# Patient Record
Sex: FEMALE | Race: WHITE | NOT HISPANIC OR LATINO | URBAN - METROPOLITAN AREA
[De-identification: names, ages, dates, MRNs, and addresses within clinical notes are randomized per-mention and may not be internally consistent; named-entity substitution may affect disease eponyms.]

---

## 2017-08-03 ENCOUNTER — ALLSCRIPTS OFFICE VISIT (OUTPATIENT)
Dept: OTHER | Facility: OTHER | Age: 52
End: 2017-08-03

## 2017-08-03 ENCOUNTER — LAB REQUISITION (OUTPATIENT)
Dept: LAB | Facility: HOSPITAL | Age: 52
End: 2017-08-03
Payer: COMMERCIAL

## 2017-08-03 DIAGNOSIS — Z12.31 ENCOUNTER FOR SCREENING MAMMOGRAM FOR MALIGNANT NEOPLASM OF BREAST: ICD-10-CM

## 2017-08-03 DIAGNOSIS — Z01.419 ENCOUNTER FOR GYNECOLOGICAL EXAMINATION WITHOUT ABNORMAL FINDING: ICD-10-CM

## 2017-08-03 PROCEDURE — G0145 SCR C/V CYTO,THINLAYER,RESCR: HCPCS | Performed by: OBSTETRICS & GYNECOLOGY

## 2017-08-03 PROCEDURE — 87624 HPV HI-RISK TYP POOLED RSLT: CPT | Performed by: OBSTETRICS & GYNECOLOGY

## 2017-08-08 LAB — HPV RRNA GENITAL QL NAA+PROBE: NORMAL

## 2017-08-09 LAB
LAB AP GYN PRIMARY INTERPRETATION: NORMAL
Lab: NORMAL

## 2017-08-10 ENCOUNTER — GENERIC CONVERSION - ENCOUNTER (OUTPATIENT)
Dept: OTHER | Facility: OTHER | Age: 52
End: 2017-08-10

## 2018-01-11 NOTE — RESULT NOTES
Verified Results  (1) THIN PREP PAP WITH IMAGING [id number] 12:23PM [facility] Order Number: [id number]     Test Name Result Flag Reference   LAB AP CASE REPORT (Report)     Gynecologic Cytology Report            Case: [id number]                  Authorizing Provider: Tucker Amin MD     Collected:      08/03/2017 1223        First Screen:     ZACH Hidalgo   Received:      08/07/2017 1318        Specimen:  LIQUID-BASED PAP, SCREENING, Cervix   HPV HIGH RISK RESULT (Report)     HPV, High Risk: HPV NEG, HPV16 NEG, HPV18 NEG      Other High Risk HPV Negative, HPV 16 Negative, HPV 18 Negative  HPV types: 16,18,31,33,35,39,45,51,52,56,58,59,66 and 68 DNA are undetectable or below the pre-set threshold  [employer]???s FDA approved Dmitriy 4800 is utilized with strict adherence to the ???s instruction  manual to test for the presence of High-Risk HPV DNA, as well as HPV 16 and HPV 18  This instrument  has been validated by our laboratory and/or by the   A negative result does not preclude the presence of HPV infection because results depend on adequate  specimen collection, absence of inhibitors and sufficient DNA to be detected  Additionally, HPV negative  results are not intended to prevent women from proceeding to colposcopy if clinically warranted  Positive HPV test results indicate the presence of any one or more of the high risk types, but since patients  are often co-infected with low-risk types it does not rule out the presence of low-risk types in patients  with mixed infections  LAB AP GYN PRIMARY INTERPRETATION      Negative for intraepithelial lesion or malignancy  Electronically signed by ZACH Hidalgo on 8/9/2017 at 4:39 PM   LAB AP GYN SPECIMEN ADEQUACY      Satisfactory for evaluation  Endocervical/transformation zone component present     LAB AP GYN ADDITIONAL INFORMATION (Report)     [employer]'s FDA approved ,  and ThinPrep Imaging System are   utilized with strict adherence to the 's instruction manual to   prepare gynecologic and non-gynecologic cytology specimens for the   production of ThinPrep slides as well as for gynecologic ThinPrep imaging  These processes have been validated by our laboratory and/or by the     The Pap test is not a diagnostic procedure and should not be used as the   sole means to detect cervical cancer  It is only a screening procedure to   aid in the detection of cervical cancer and its precursors  Both   false-negative and false-positive results have been experienced  Your   patient's test result should be interpreted in this context together with   the history and clinical findings     LAB AP LMP not given     not given

## 2018-01-13 VITALS
HEART RATE: 78 BPM | BODY MASS INDEX: 23.25 KG/M2 | WEIGHT: 127.13 LBS | OXYGEN SATURATION: 98 % | DIASTOLIC BLOOD PRESSURE: 66 MMHG | SYSTOLIC BLOOD PRESSURE: 110 MMHG

## 2018-04-23 ENCOUNTER — OFFICE VISIT (OUTPATIENT)
Dept: LAB | Facility: HOSPITAL | Age: 53
End: 2018-04-23
Payer: COMMERCIAL

## 2018-04-23 ENCOUNTER — TRANSCRIBE ORDERS (OUTPATIENT)
Dept: ADMINISTRATIVE | Facility: HOSPITAL | Age: 53
End: 2018-04-23

## 2018-04-23 DIAGNOSIS — Z01.818 PREOP EXAMINATION: ICD-10-CM

## 2018-04-23 DIAGNOSIS — Z01.818 PREOP EXAMINATION: Primary | ICD-10-CM

## 2018-04-23 PROCEDURE — 93005 ELECTROCARDIOGRAM TRACING: CPT

## 2018-04-24 LAB
ATRIAL RATE: 73 BPM
P AXIS: 14 DEGREES
PR INTERVAL: 128 MS
QRS AXIS: 64 DEGREES
QRSD INTERVAL: 78 MS
QT INTERVAL: 362 MS
QTC INTERVAL: 398 MS
T WAVE AXIS: 60 DEGREES
VENTRICULAR RATE: 73 BPM

## 2018-04-24 PROCEDURE — 93010 ELECTROCARDIOGRAM REPORT: CPT | Performed by: INTERNAL MEDICINE

## 2018-06-05 ENCOUNTER — OFFICE VISIT (OUTPATIENT)
Dept: OBGYN CLINIC | Facility: CLINIC | Age: 53
End: 2018-06-05
Payer: COMMERCIAL

## 2018-06-05 VITALS
SYSTOLIC BLOOD PRESSURE: 98 MMHG | BODY MASS INDEX: 21.68 KG/M2 | DIASTOLIC BLOOD PRESSURE: 62 MMHG | HEART RATE: 86 BPM | HEIGHT: 64 IN | WEIGHT: 127 LBS

## 2018-06-05 DIAGNOSIS — M65.312 TRIGGER THUMB OF LEFT HAND: Primary | ICD-10-CM

## 2018-06-05 PROCEDURE — 99203 OFFICE O/P NEW LOW 30 MIN: CPT | Performed by: ORTHOPAEDIC SURGERY

## 2018-06-05 PROCEDURE — 20550 NJX 1 TENDON SHEATH/LIGAMENT: CPT | Performed by: ORTHOPAEDIC SURGERY

## 2018-06-05 RX ORDER — LIDOCAINE HYDROCHLORIDE 10 MG/ML
0.5 INJECTION, SOLUTION INFILTRATION; PERINEURAL
Status: COMPLETED | OUTPATIENT
Start: 2018-06-05 | End: 2018-06-05

## 2018-06-05 RX ORDER — GABAPENTIN 100 MG/1
CAPSULE ORAL
COMMUNITY
Start: 2018-05-15

## 2018-06-05 RX ORDER — TRAZODONE HYDROCHLORIDE 50 MG/1
TABLET ORAL
COMMUNITY
Start: 2018-05-03

## 2018-06-05 RX ORDER — CITALOPRAM 20 MG/1
TABLET ORAL
COMMUNITY
Start: 2018-05-03

## 2018-06-05 RX ORDER — DIVALPROEX SODIUM 500 MG/1
TABLET, DELAYED RELEASE ORAL
COMMUNITY

## 2018-06-05 RX ORDER — ACETAMINOPHEN 500 MG
2 TABLET ORAL DAILY
COMMUNITY

## 2018-06-05 RX ORDER — DIVALPROEX SODIUM 250 MG/1
TABLET, DELAYED RELEASE ORAL
COMMUNITY
Start: 2018-05-04

## 2018-06-05 RX ORDER — TRIAMCINOLONE ACETONIDE 40 MG/ML
20 INJECTION, SUSPENSION INTRA-ARTICULAR; INTRAMUSCULAR
Status: COMPLETED | OUTPATIENT
Start: 2018-06-05 | End: 2018-06-05

## 2018-06-05 RX ORDER — VALACYCLOVIR HYDROCHLORIDE 500 MG/1
TABLET, FILM COATED ORAL
COMMUNITY
Start: 2017-08-03

## 2018-06-05 RX ORDER — LORAZEPAM 2 MG/1
TABLET ORAL
COMMUNITY
Start: 2018-05-12

## 2018-06-05 RX ORDER — ZOLPIDEM TARTRATE 12.5 MG/1
1 TABLET, FILM COATED, EXTENDED RELEASE ORAL
COMMUNITY

## 2018-06-05 RX ORDER — PHENAZOPYRIDINE HYDROCHLORIDE 200 MG/1
TABLET, FILM COATED ORAL
COMMUNITY
Start: 2018-05-03

## 2018-06-05 RX ADMIN — TRIAMCINOLONE ACETONIDE 20 MG: 40 INJECTION, SUSPENSION INTRA-ARTICULAR; INTRAMUSCULAR at 12:28

## 2018-06-05 RX ADMIN — LIDOCAINE HYDROCHLORIDE 0.5 ML: 10 INJECTION, SOLUTION INFILTRATION; PERINEURAL at 12:28

## 2018-06-05 NOTE — PROGRESS NOTES
Assessment/Plan:  1  Trigger thumb of left hand       Tania Alonso has left-sided thumb pain consistent with trigger thumb  We did elect to provide a cortisone injection to her tendon sheath today  She tolerated the injection well and did receive some relief immediately after the injection  I informed her that this typically improves her discomfort in the next 3-4 weeks if this does not improve the pain and discomfort would like to see her back or discuss referral to Dr Emily Monroy if triggering worsens  Subjective:   Quynh Johnson is a 47 y o  female who presents for evaluation for left-sided thumb pain  She has noticed over the past 1 month that she has had increased discomfort over the volar aspect of her left thumb at the base around the MCP joint  She feels a lot of discomfort with attempted flexion of the thumb  She feels like increased activity at work using her hands as cause more discomfort  Her mother has a history of trigger thumb injected by Dr Emily Monroy in the past and she states that it feels similar to what her mother describes  She denies any trauma other than overuse in this area  She does feel occasional triggering sensation but is more pronounced with pain  She denies any numbness into her hand or thumb  She denies any pain in the wrist     Review of Systems   Constitutional: Negative for chills, fever and unexpected weight change  HENT: Negative for hearing loss, nosebleeds and sore throat  Eyes: Negative for pain, redness and visual disturbance  Respiratory: Negative for cough, shortness of breath and wheezing  Cardiovascular: Negative for chest pain, palpitations and leg swelling  Gastrointestinal: Negative for abdominal pain, nausea and vomiting  Endocrine: Negative for polydipsia and polyuria  Genitourinary: Negative for dysuria and hematuria  Musculoskeletal:        See HPI   Skin: Negative for rash and wound  Neurological: Negative for dizziness, numbness and headaches  Psychiatric/Behavioral: Negative for decreased concentration and suicidal ideas  The patient is not nervous/anxious  History reviewed  No pertinent past medical history  Past Surgical History:   Procedure Laterality Date     SECTION      LAPAROSCOPIC ENDOMETRIOSIS FULGURATION         History reviewed  No pertinent family history  Social History     Occupational History    Not on file  Social History Main Topics    Smoking status: Never Smoker    Smokeless tobacco: Never Used    Alcohol use No    Drug use: No    Sexual activity: Not on file         Current Outpatient Prescriptions:     acetaminophen (TYLENOL) 500 mg tablet, Take 2 tablets by mouth daily, Disp: , Rfl:     citalopram (CeleXA) 20 mg tablet, , Disp: , Rfl:     divalproex sodium (DEPAKOTE) 250 mg EC tablet, , Disp: , Rfl:     divalproex sodium (DEPAKOTE) 500 mg EC tablet, Take by mouth, Disp: , Rfl:     gabapentin (NEURONTIN) 100 mg capsule, , Disp: , Rfl:     LORazepam (ATIVAN) 2 mg tablet, , Disp: , Rfl:     phenazopyridine (PYRIDIUM) 200 mg tablet, , Disp: , Rfl:     traZODone (DESYREL) 50 mg tablet, , Disp: , Rfl:     valACYclovir (VALTREX) 500 mg tablet, Take by mouth, Disp: , Rfl:     zolpidem (AMBIEN CR) 12 5 MG CR tablet, Take 1 tablet by mouth, Disp: , Rfl:     Allergies   Allergen Reactions    Ciprofloxacin GI Intolerance       Objective:  Vitals:    18 1145   BP: 98/62   Pulse: 86       Left Hand Exam     Tenderness   Left hand tenderness location: Tenderness to palpation over the volar aspect of the MCP joint at base of the left thumb  Range of Motion     Hand   MP Thumb: abnormal   DIP Thumb: normal     Other   Erythema: absent  Sensation: normal  Pulse: present    Comments:  Swelling over the tendon sheath at the A1 pulley consistent with trigger finger            Physical Exam   Constitutional: She is oriented to person, place, and time  She appears well-developed and well-nourished  HENT:   Head: Normocephalic and atraumatic  Eyes: Conjunctivae are normal    Neck: Neck supple  Cardiovascular: Intact distal pulses  Pulmonary/Chest: Effort normal    Neurological: She is alert and oriented to person, place, and time  Skin: Skin is warm and dry  Psychiatric: She has a normal mood and affect  Her behavior is normal    Vitals reviewed          Hand/upper extremity injection  Date/Time: 6/5/2018 12:28 PM  Consent given by: patient  Site marked: site marked  Timeout: Immediately prior to procedure a time out was called to verify the correct patient, procedure, equipment, support staff and site/side marked as required   Supporting Documentation  Indications: pain and therapeutic   Procedure Details  Condition:trigger finger Location: thumb - L thumb A1   Needle size: 25 G  Ultrasound guidance: no  Approach: volar  Medications administered: 0 5 mL lidocaine 1 %; 20 mg triamcinolone acetonide 40 mg/mL  Patient tolerance: patient tolerated the procedure well with no immediate complications  Dressing:  Sterile dressing applied

## 2024-03-26 ENCOUNTER — TELEPHONE (OUTPATIENT)
Age: 59
End: 2024-03-26

## 2024-03-26 ENCOUNTER — OFFICE VISIT (OUTPATIENT)
Dept: GASTROENTEROLOGY | Facility: CLINIC | Age: 59
End: 2024-03-26
Payer: COMMERCIAL

## 2024-03-26 VITALS
BODY MASS INDEX: 23.74 KG/M2 | WEIGHT: 134 LBS | HEIGHT: 63 IN | HEART RATE: 64 BPM | SYSTOLIC BLOOD PRESSURE: 99 MMHG | OXYGEN SATURATION: 96 % | DIASTOLIC BLOOD PRESSURE: 66 MMHG

## 2024-03-26 DIAGNOSIS — K57.90 DIVERTICULOSIS: ICD-10-CM

## 2024-03-26 DIAGNOSIS — A07.1 GIARDIA: ICD-10-CM

## 2024-03-26 DIAGNOSIS — R19.7 DIARRHEA, UNSPECIFIED TYPE: ICD-10-CM

## 2024-03-26 DIAGNOSIS — R19.4 CHANGE IN BOWEL HABIT: Primary | ICD-10-CM

## 2024-03-26 DIAGNOSIS — R10.30 LOWER ABDOMINAL PAIN: ICD-10-CM

## 2024-03-26 PROCEDURE — 99203 OFFICE O/P NEW LOW 30 MIN: CPT | Performed by: NURSE PRACTITIONER

## 2024-03-26 RX ORDER — CLONAZEPAM 1 MG/1
TABLET, ORALLY DISINTEGRATING ORAL
COMMUNITY
Start: 2024-02-13

## 2024-03-26 RX ORDER — FLUVOXAMINE MALEATE 100 MG
150 TABLET ORAL DAILY
COMMUNITY
Start: 2024-03-17

## 2024-03-26 RX ORDER — DIVALPROEX SODIUM 500 MG/1
1000 TABLET, EXTENDED RELEASE ORAL
COMMUNITY
Start: 2024-03-16

## 2024-03-26 RX ORDER — POLYETHYLENE GLYCOL 3350, SODIUM CHLORIDE, SODIUM BICARBONATE, POTASSIUM CHLORIDE 420; 11.2; 5.72; 1.48 G/4L; G/4L; G/4L; G/4L
4000 POWDER, FOR SOLUTION ORAL ONCE
Qty: 4000 ML | Refills: 0 | Status: SHIPPED | OUTPATIENT
Start: 2024-03-26 | End: 2024-03-26

## 2024-03-26 RX ORDER — PROPRANOLOL HCL 60 MG
60 CAPSULE, EXTENDED RELEASE 24HR ORAL
COMMUNITY
Start: 2024-03-04

## 2024-03-26 RX ORDER — TRAZODONE HYDROCHLORIDE 100 MG/1
100 TABLET ORAL
COMMUNITY
Start: 2024-03-17

## 2024-03-26 NOTE — PROGRESS NOTES
[facility] [place] - Outpatient Consultation  Rosina Tracey 59 y.o. female MRN: [id number]  Encounter: [id number]          ASSESSMENT AND PLAN:      1. Change in bowel habit  2. Lower abdominal pain  3. Diverticulosis  Pt with recent change in bowel habit with alternating constipation/diarrhea with associated lower abdominal cramping for the last 3 months, worse in the last month. Used to have chronic diarrhea and last colonoscopy in 2016 showed internal hemorrhoids and mild diverticulosis with random colon bx negative for microscopic colitis, however now symptoms seem related to constipation with no bowel movement for 3-4 days followed by abdominal cramping and diarrhea.  Denies any medication changes, unintended weight loss, rectal bleeding.  Reports dietary fiber tends to decrease in the winter months because she prefers fresh produce.    -Obtain baseline labs including TSH   -Colonoscopy ordered due to change in bowel habit  -Schedule CTAP to assess for any active inflammation/diverticulitis prior to colonoscopy  -Start Miralax 17g daily in the interim for constipation    -     CBC (Includes Diff/Plt) (Refl); Future  -     Comprehensive metabolic panel; Future  -     TSH, 3rd generation with Free T4 reflex; Future  -     CT abdomen pelvis w contrast; Future; Expected date: 03/26/2024  -     Colonoscopy; Future; Expected date: 03/26/2024  -     polyethylene glycol-electrolytes (TriLyte) 4000 mL solution; Take 4,000 mL by mouth once for 1 dose Take 4000 mL by mouth once for 1 dose. Use as directed          ______________________________________________________________________    HPI:  Rosina Tracey is a 59 y.o. female presenting for evaluation of abdominal cramping and irregular bowel habits. Symptoms have been ongoing the last 3 months, but particularly worse over the last month. She reports she goes 3-4 days without a bowel movement and then has abdominal cramping and diarrhea. Denies any  rectal bleeding, unintended weight loss, med changes. She did start a new diet with weight watchers but this was a year ago. She has been taking Miralax once every 2-3 weeks. Has associated nausea/bloating. Denies any family history of colon cancer. Last colonoscopy was in 2016 with random colon biopsies negative for microscopic colitis. She last saw Dr. Hale for constipation which responded well to Miralax.          REVIEW OF SYSTEMS:    CONSTITUTIONAL: Denies any fever, chills, rigors, and weight loss.  HEENT: No earache or tinnitus.  CARDIOVASCULAR: No chest pain or palpitations.   RESPIRATORY: Denies any cough, hemoptysis, shortness of breath or dyspnea on exertion.  GASTROINTESTINAL: As noted in the History of Present Illness.   GENITOURINARY: Denies any hematuria or dysuria.  NEUROLOGIC: No dizziness or vertigo.   MUSCULOSKELETAL: Denies any joint swellings.  SKIN: Denies skin rashes or itching.   ENDOCRINE: Denies excessive thirst. Denies intolerance to heat or cold.  PSYCHOSOCIAL: Denies depression or anxiety. Denies any recent memory loss.       Historical Information   History reviewed. No pertinent past medical history.  Past Surgical History:   Procedure Laterality Date     SECTION      LAPAROSCOPIC ENDOMETRIOSIS FULGURATION       Social History   Social History     Substance and Sexual Activity   Alcohol Use No     Social History     Substance and Sexual Activity   Drug Use No     Social History     Tobacco Use   Smoking Status Never   Smokeless Tobacco Never     History reviewed. No pertinent family history.    Meds/Allergies       Current Outpatient Medications:     acetaminophen (TYLENOL) 500 mg tablet    clonazePAM (KlonoPIN) 1 MG disintegrating tablet    divalproex sodium (DEPAKOTE ER) 500 mg 24 hr tablet    fluvoxaMINE (LUVOX) 100 mg tablet    polyethylene glycol-electrolytes (TriLyte) 4000 mL solution    propranolol (INDERAL LA) 60 mg 24 hr capsule    traZODone (DESYREL) 100 mg tablet    " valACYclovir (VALTREX) 500 mg tablet    citalopram (CeleXA) 20 mg tablet    gabapentin (NEURONTIN) 100 mg capsule    LORazepam (ATIVAN) 2 mg tablet    phenazopyridine (PYRIDIUM) 200 mg tablet    zolpidem (AMBIEN CR) 12.5 MG CR tablet    Allergies   Allergen Reactions    Azithromycin Other (See Comments)    Ciprofloxacin GI Intolerance and Other (See Comments)           Objective     Blood pressure 99/66, pulse 64, height 5' 3\" (1.6 m), weight 60.8 kg (134 lb), SpO2 96%. Body mass index is 23.74 kg/m².        PHYSICAL EXAM:      General Appearance:   Alert, cooperative, no distress   HEENT:   Normocephalic, atraumatic, anicteric.     Neck:  Supple, symmetrical, trachea midline   Lungs:   Clear to auscultation bilaterally; no rales, rhonchi or wheezing; respirations unlabored    Heart::   Regular rate and rhythm; no murmur.   Abdomen:   Soft, non-tender, non-distended; normal bowel sounds; no masses, no organomegaly    Genitalia:   Deferred    Rectal:   Deferred    Extremities:  No cyanosis, clubbing or edema    Skin:  No jaundice, rashes, or lesions    Lymph nodes:  No palpable cervical lymphadenopathy        Lab Results:   No visits with results within 1 Day(s) from this visit.   Latest known visit with results is:   Office Visit on 04/23/2018   Component Date Value    Ventricular Rate 04/23/2018 73     Atrial Rate 04/23/2018 73     MO Interval 04/23/2018 128     QRSD Interval 04/23/2018 78     QT Interval 04/23/2018 362     QTC Interval 04/23/2018 398     P Axis 04/23/2018 14     QRS Axis 04/23/2018 64     T Wave Dubois 04/23/2018 60          Radiology Results:   No results found.  "

## 2024-03-26 NOTE — PATIENT INSTRUCTIONS
Scheduled date of colonoscopy (as of today):24  Physician performing colonoscopy:Dr. Hale  Location of colonoscopy:[facility]  Bowel prep reviewed with patient:Enrique  Instructions reviewed with patient by:lauren  Clearances:n/a    Start Miralax 1 capful daily    High Fiber Diet   AMBULATORY CARE:   A high-fiber diet  includes foods that have a high amount of fiber. Fiber is the part of fruits, vegetables, and grains that is not broken down by your body. Fiber keeps your bowel movements regular. Fiber can also help lower your cholesterol level, control blood sugar in people with diabetes, and relieve constipation. Fiber can also help you control your weight because it helps you feel full faster. Most adults should eat 25 to 35 grams of fiber each day. Talk to your dietitian or healthcare provider about the amount of fiber you need.  Good sources of fiber:       Foods with at least 4 grams of fiber per serving:      ? to ½ cup of high-fiber cereal (check the nutrition label on the box)    ½ cup of blackberries or raspberries    4 dried prunes    1 cooked artichoke    ½ cup of cooked legumes, such as lentils, or red, kidney, and lubin beans    Foods with 1 to 3 grams of fiber per servin slice of whole-wheat, pumpernickel, or rye bread    ½ cup of cooked brown rice    4 whole-wheat crackers    1 cup of oatmeal    ½ cup of cereal with 1 to 3 grams of fiber per serving (check the nutrition label on the box)    1 small piece of fruit, such as an apple, banana, pear, kiwi, or orange    3 dates    ½ cup of canned apricots, fruit cocktail, peaches, or pears    ½ cup of raw or cooked vegetables, such as carrots, cauliflower, cabbage, spinach, squash, or corn  Ways that you can increase fiber in your diet:   Choose brown or wild rice instead of white rice.     Use whole wheat flour in recipes instead of white or all-purpose flour.     Add beans and peas to casseroles or soups.     Choose fresh fruit and vegetables with  peels or skins on instead of juices.    Other diet guidelines to follow:   Add fiber to your diet slowly.  You may have abdominal discomfort, bloating, and gas if you add fiber to your diet too quickly.     Drink plenty of liquids as you add fiber to your diet.  You may have nausea or develop constipation if you do not drink enough water. Ask how much liquid to drink each day and which liquids are best for you.    © Copyright [employer] 2023 Information is for End User's use only and may not be sold, redistributed or otherwise used for commercial purposes.  The above information is an  only. It is not intended as medical advice for individual conditions or treatments. Talk to your doctor, nurse or pharmacist before following any medical regimen to see if it is safe and effective for you.

## 2024-03-26 NOTE — LETTER
[facility]  [address] [address]  [phone]  Dept: [phone]    March 27, 2024     Patient: Rosina Tracey   YOB: 1965   Date of Visit: 3/26/24       To Whom it May Concern:    Rosina Tracey is under my professional care. She was seen in the office 03/26/24. She may return to work on 4/2/24 without limitations.    If you have any questions or concerns, please don't hesitate to call.         Sincerely,          NATALIA Holguin

## 2024-03-27 ENCOUNTER — TELEPHONE (OUTPATIENT)
Age: 59
End: 2024-03-27

## 2024-03-27 NOTE — TELEPHONE ENCOUNTER
Pt is calling to see if she can have a work note until after her ct scan on 4/1/24. She is requesting a work note until 4/2/24. Please reach back out to the pt

## 2024-03-27 NOTE — TELEPHONE ENCOUNTER
Spoke to patient she doesn't feel any worse than when she was in the office just feels fatigued/wiped out. Works at a busy [employer] and would like off until she regains her strength. Reports 1 loose BM daily.

## 2024-03-28 ENCOUNTER — APPOINTMENT (OUTPATIENT)
Dept: LAB | Facility: HOSPITAL | Age: 59
End: 2024-03-28
Payer: COMMERCIAL

## 2024-03-28 DIAGNOSIS — R19.7 DIARRHEA, UNSPECIFIED TYPE: ICD-10-CM

## 2024-03-28 DIAGNOSIS — R10.30 LOWER ABDOMINAL PAIN: ICD-10-CM

## 2024-03-28 DIAGNOSIS — R19.4 CHANGE IN BOWEL HABIT: ICD-10-CM

## 2024-03-28 LAB
ALBUMIN SERPL BCP-MCNC: 4.1 G/DL (ref 3.5–5)
ALP SERPL-CCNC: 29 U/L (ref 34–104)
ALT SERPL W P-5'-P-CCNC: 13 U/L (ref 7–52)
ANION GAP SERPL CALCULATED.3IONS-SCNC: 5 MMOL/L (ref 4–13)
AST SERPL W P-5'-P-CCNC: 18 U/L (ref 13–39)
BASOPHILS # BLD AUTO: 0.01 THOUSANDS/ÂΜL (ref 0–0.1)
BASOPHILS NFR BLD AUTO: 0 % (ref 0–1)
BILIRUB SERPL-MCNC: 0.65 MG/DL (ref 0.2–1)
BUN SERPL-MCNC: 14 MG/DL (ref 5–25)
CALCIUM SERPL-MCNC: 9.5 MG/DL (ref 8.4–10.2)
CHLORIDE SERPL-SCNC: 108 MMOL/L (ref 96–108)
CO2 SERPL-SCNC: 30 MMOL/L (ref 21–32)
CREAT SERPL-MCNC: 0.76 MG/DL (ref 0.6–1.3)
EOSINOPHIL # BLD AUTO: 0.4 THOUSAND/ÂΜL (ref 0–0.61)
EOSINOPHIL NFR BLD AUTO: 13 % (ref 0–6)
ERYTHROCYTE [DISTWIDTH] IN BLOOD BY AUTOMATED COUNT: 12.1 % (ref 11.6–15.1)
GFR SERPL CREATININE-BSD FRML MDRD: 86 ML/MIN/1.73SQ M
GLUCOSE P FAST SERPL-MCNC: 87 MG/DL (ref 65–99)
HCT VFR BLD AUTO: 38.2 % (ref 34.8–46.1)
HGB BLD-MCNC: 12.8 G/DL (ref 11.5–15.4)
IMM GRANULOCYTES # BLD AUTO: 0.01 THOUSAND/UL (ref 0–0.2)
IMM GRANULOCYTES NFR BLD AUTO: 0 % (ref 0–2)
LYMPHOCYTES # BLD AUTO: 1.3 THOUSANDS/ÂΜL (ref 0.6–4.47)
LYMPHOCYTES NFR BLD AUTO: 41 % (ref 14–44)
MCH RBC QN AUTO: 31 PG (ref 26.8–34.3)
MCHC RBC AUTO-ENTMCNC: 33.5 G/DL (ref 31.4–37.4)
MCV RBC AUTO: 93 FL (ref 82–98)
MONOCYTES # BLD AUTO: 0.36 THOUSAND/ÂΜL (ref 0.17–1.22)
MONOCYTES NFR BLD AUTO: 11 % (ref 4–12)
NEUTROPHILS # BLD AUTO: 1.12 THOUSANDS/ÂΜL (ref 1.85–7.62)
NEUTS SEG NFR BLD AUTO: 35 % (ref 43–75)
NRBC BLD AUTO-RTO: 0 /100 WBCS
PLATELET # BLD AUTO: 168 THOUSANDS/UL (ref 149–390)
PMV BLD AUTO: 10.7 FL (ref 8.9–12.7)
POTASSIUM SERPL-SCNC: 4.2 MMOL/L (ref 3.5–5.3)
PROT SERPL-MCNC: 6.7 G/DL (ref 6.4–8.4)
RBC # BLD AUTO: 4.13 MILLION/UL (ref 3.81–5.12)
SODIUM SERPL-SCNC: 143 MMOL/L (ref 135–147)
TSH SERPL DL<=0.05 MIU/L-ACNC: 1.5 UIU/ML (ref 0.45–4.5)
WBC # BLD AUTO: 3.2 THOUSAND/UL (ref 4.31–10.16)

## 2024-03-28 PROCEDURE — 36415 COLL VENOUS BLD VENIPUNCTURE: CPT

## 2024-03-28 PROCEDURE — 84443 ASSAY THYROID STIM HORMONE: CPT

## 2024-03-28 PROCEDURE — 85025 COMPLETE CBC W/AUTO DIFF WBC: CPT

## 2024-03-28 PROCEDURE — 80053 COMPREHEN METABOLIC PANEL: CPT

## 2024-04-01 ENCOUNTER — HOSPITAL ENCOUNTER (OUTPATIENT)
Dept: RADIOLOGY | Facility: HOSPITAL | Age: 59
Discharge: HOME | End: 2024-04-01
Payer: COMMERCIAL

## 2024-04-01 ENCOUNTER — APPOINTMENT (OUTPATIENT)
Dept: LAB | Facility: HOSPITAL | Age: 59
End: 2024-04-01
Payer: COMMERCIAL

## 2024-04-01 DIAGNOSIS — K57.90 DIVERTICULOSIS: ICD-10-CM

## 2024-04-01 DIAGNOSIS — R19.4 CHANGE IN BOWEL HABIT: ICD-10-CM

## 2024-04-01 DIAGNOSIS — R10.30 LOWER ABDOMINAL PAIN: ICD-10-CM

## 2024-04-01 DIAGNOSIS — R19.7 DIARRHEA OF PRESUMED INFECTIOUS ORIGIN: ICD-10-CM

## 2024-04-01 PROCEDURE — 87209 SMEAR COMPLEX STAIN: CPT

## 2024-04-01 PROCEDURE — 87505 NFCT AGENT DETECTION GI: CPT

## 2024-04-01 PROCEDURE — 87329 GIARDIA AG IA: CPT

## 2024-04-01 PROCEDURE — 87177 OVA AND PARASITES SMEARS: CPT

## 2024-04-01 PROCEDURE — 74177 CT ABD & PELVIS W/CONTRAST: CPT

## 2024-04-01 PROCEDURE — 87493 C DIFF AMPLIFIED PROBE: CPT

## 2024-04-01 RX ADMIN — IOHEXOL 100 ML: 350 INJECTION, SOLUTION INTRAVENOUS at 09:12

## 2024-04-02 LAB
C COLI+JEJUNI TUF STL QL NAA+PROBE: NEGATIVE
C DIFF TOX GENS STL QL NAA+PROBE: NEGATIVE
EC STX1+STX2 GENES STL QL NAA+PROBE: NEGATIVE
G LAMBLIA AG STL QL IA: NEGATIVE
SALMONELLA SP SPAO STL QL NAA+PROBE: NEGATIVE
SHIGELLA SP+EIEC IPAH STL QL NAA+PROBE: NEGATIVE

## 2024-04-22 ENCOUNTER — ANESTHESIA (OUTPATIENT)
Dept: ANESTHESIOLOGY | Facility: HOSPITAL | Age: 59
End: 2024-04-22

## 2024-04-22 ENCOUNTER — NURSE TRIAGE (OUTPATIENT)
Age: 59
End: 2024-04-22

## 2024-04-22 ENCOUNTER — TELEPHONE (OUTPATIENT)
Age: 59
End: 2024-04-22

## 2024-04-22 ENCOUNTER — ANESTHESIA EVENT (OUTPATIENT)
Dept: ANESTHESIOLOGY | Facility: HOSPITAL | Age: 59
End: 2024-04-22

## 2024-04-22 DIAGNOSIS — R19.7 DIARRHEA, UNSPECIFIED TYPE: Primary | ICD-10-CM

## 2024-04-22 RX ORDER — DICYCLOMINE HYDROCHLORIDE 10 MG/1
10 CAPSULE ORAL 3 TIMES DAILY PRN
Qty: 90 CAPSULE | Refills: 1 | Status: SHIPPED | OUTPATIENT
Start: 2024-04-22

## 2024-04-22 NOTE — TELEPHONE ENCOUNTER
"Last OV 3/26/24  CT Abdomen/Pelvis 4/1/24 Normal  Labs/Stool studies 4/1/24 Negative  Colonoscopy 7/2/24    Pt transferred in with reports of loose to watery stools and bloating/cramping since Thursday. Having multiple BM's daily. Has almost had episodes of incontinence. \"Almost feels like it runs out when she stands up. Cramps in abdomen and back; pain 5-6/10    Eating crackers. Denies n/v, fever, sick contacts,new food, blood in stools, Did take Augmentin for sinus infection in February but only completed 4 days as it was difficult to tolerate.    Recommended OTC Pepto or Imodium, probiotics, increased hydration with electrolytes, clear diet and advance to BRAT/[place] diet as tolerated. Pt verbalized understanding.    Pt transferred to scheduling at end of call to move up Colonoscopy.    Please advise if you have further recommendations.     Pt uses [facility]    Pt [phone]      Reason for Disposition   SEVERE diarrhea (e.g., 7 or more times / day more than normal)    Answer Assessment - Initial Assessment Questions  1. DIARRHEA SEVERITY: \"How bad is the diarrhea?\" \"How many extra stools have you had in the past 24 hours than normal?\"     - NO DIARRHEA (SCALE 0)    - MILD (SCALE 1-3): Few loose or mushy BMs; increase of 1-3 stools over normal daily number of stools; mild increase in ostomy output.    -  MODERATE (SCALE 4-7): Increase of 4-6 stools daily over normal; moderate increase in ostomy output.  * SEVERE (SCALE 8-10; OR 'WORST POSSIBLE'): Increase of 7 or more stools daily over normal; moderate increase in ostomy output; incontinence.      Moderate to severe  2. ONSET: \"When did the diarrhea begin?\"       Thursday  3. BM CONSISTENCY: \"How loose or watery is the diarrhea?\"       Watery   4. VOMITING: \"Are you also vomiting?\" If Yes, ask: \"How many times in the past 24 hours?\"       Denies  5. ABDOMINAL PAIN: \"Are you having any abdominal pain?\" If Yes, ask: \"What does it feel like?\" (e.g., crampy, dull, " "intermittent, constant)       Cramping   6. ABDOMINAL PAIN SEVERITY: If present, ask: \"How bad is the pain?\"  (e.g., Scale 1-10; mild, moderate, or severe)    - MODERATE (4-7): interferes with normal activities or awakens from sleep, tender to touch       5-6/10  7. ORAL INTAKE: If vomiting, \"Have you been able to drink liquids?\" \"How much fluids have you had in the past 24 hours?\"      Yes, drinking  8. HYDRATION: \"Any signs of dehydration?\" (e.g., dry mouth [not just dry lips], too weak to stand, dizziness, new weight loss) \"When did you last urinate?\"      Denies  9. EXPOSURE: \"Have you traveled to a foreign country recently?\" \"Have you been exposed to anyone with diarrhea?\" \"Could you have eaten any food that was spoiled?\"      Denies  10. ANTIBIOTIC USE: \"Are you taking antibiotics now or have you taken antibiotics in the past 2 months?\"        Yes incomplete course  11. OTHER SYMPTOMS: \"Do you have any other symptoms?\" (e.g., fever, blood in stool)        Denies    Protocols used: Diarrhea-ADULT-[place]    "

## 2024-04-22 NOTE — TELEPHONE ENCOUNTER
Pt calling to advise she is having chronic diarrhea and wanted to speak to someone. Transferred to the nurse

## 2024-04-24 NOTE — TELEPHONE ENCOUNTER
Pt returning call, I did review recommendations. Pt reports her c diff stool study she had some at [facility] and it was negative. She is taking imodium occasionally she does not want it to cause more constipation.     Pt also wanted to know if she can switch bowel preps to miralax/ dulcolax instead of Golytely since it makes her sick. Please advise

## 2024-05-06 ENCOUNTER — HOSPITAL ENCOUNTER (OUTPATIENT)
Dept: GASTROENTEROLOGY | Facility: AMBULARY SURGERY CENTER | Age: 59
Setting detail: OUTPATIENT SURGERY
Discharge: HOME | End: 2024-05-06
Attending: INTERNAL MEDICINE | Admitting: INTERNAL MEDICINE
Payer: COMMERCIAL

## 2024-05-06 ENCOUNTER — ANESTHESIA EVENT (OUTPATIENT)
Dept: GASTROENTEROLOGY | Facility: AMBULARY SURGERY CENTER | Age: 59
End: 2024-05-06

## 2024-05-06 ENCOUNTER — ANESTHESIA (OUTPATIENT)
Dept: GASTROENTEROLOGY | Facility: AMBULARY SURGERY CENTER | Age: 59
End: 2024-05-06

## 2024-05-06 VITALS
HEART RATE: 62 BPM | SYSTOLIC BLOOD PRESSURE: 90 MMHG | BODY MASS INDEX: 21.85 KG/M2 | HEIGHT: 64 IN | RESPIRATION RATE: 16 BRPM | DIASTOLIC BLOOD PRESSURE: 63 MMHG | OXYGEN SATURATION: 96 % | WEIGHT: 128 LBS | TEMPERATURE: 97 F

## 2024-05-06 DIAGNOSIS — R19.4 CHANGE IN BOWEL HABIT: ICD-10-CM

## 2024-05-06 DIAGNOSIS — R10.30 LOWER ABDOMINAL PAIN: ICD-10-CM

## 2024-05-06 PROCEDURE — 45378 DIAGNOSTIC COLONOSCOPY: CPT | Performed by: INTERNAL MEDICINE

## 2024-05-06 RX ORDER — PROPOFOL 10 MG/ML
INJECTION, EMULSION INTRAVENOUS AS NEEDED
Status: DISCONTINUED | OUTPATIENT
Start: 2024-05-06 | End: 2024-05-06

## 2024-05-06 RX ORDER — SODIUM CHLORIDE, SODIUM LACTATE, POTASSIUM CHLORIDE, CALCIUM CHLORIDE 600; 310; 30; 20 MG/100ML; MG/100ML; MG/100ML; MG/100ML
100 INJECTION, SOLUTION INTRAVENOUS CONTINUOUS
Status: DISCONTINUED | OUTPATIENT
Start: 2024-05-06 | End: 2024-05-10 | Stop reason: HOSPADM

## 2024-05-06 RX ORDER — SODIUM CHLORIDE, SODIUM LACTATE, POTASSIUM CHLORIDE, CALCIUM CHLORIDE 600; 310; 30; 20 MG/100ML; MG/100ML; MG/100ML; MG/100ML
100 INJECTION, SOLUTION INTRAVENOUS CONTINUOUS
Status: CANCELLED | OUTPATIENT
Start: 2024-05-06

## 2024-05-06 RX ADMIN — SODIUM CHLORIDE, SODIUM LACTATE, POTASSIUM CHLORIDE, AND CALCIUM CHLORIDE: .6; .31; .03; .02 INJECTION, SOLUTION INTRAVENOUS at 11:18

## 2024-05-06 RX ADMIN — PROPOFOL 120 MG: 10 INJECTION, EMULSION INTRAVENOUS at 11:22

## 2024-05-06 RX ADMIN — PROPOFOL 20 MG: 10 INJECTION, EMULSION INTRAVENOUS at 11:29

## 2024-05-06 RX ADMIN — PROPOFOL 30 MG: 10 INJECTION, EMULSION INTRAVENOUS at 11:25

## 2024-05-06 RX ADMIN — PROPOFOL 20 MG: 10 INJECTION, EMULSION INTRAVENOUS at 11:27

## 2024-05-06 NOTE — H&P
"History and Physical -  [facility]  Rosina KELIN Tracey 59 y.o. female MRN: [id number]        HPI: 59-year-old female with history of anxiety, depression was referred because of change in bowel habits    Historical Information   Past Medical History:   Diagnosis Date    Anxiety     Depression      Past Surgical History:   Procedure Laterality Date     SECTION      LAPAROSCOPIC ENDOMETRIOSIS FULGURATION       Social History   Social History     Substance and Sexual Activity   Alcohol Use No     Social History     Substance and Sexual Activity   Drug Use No     Social History     Tobacco Use   Smoking Status Never   Smokeless Tobacco Never     History reviewed. No pertinent family history.    Meds/Allergies     (Not in a hospital admission)      Allergies   Allergen Reactions    Azithromycin Other (See Comments)    Ciprofloxacin GI Intolerance and Other (See Comments)       Objective     Blood pressure 100/52, pulse 62, temperature (!) 97.1 °F (36.2 °C), temperature source Temporal, resp. rate 16, height 5' 3.5\" (1.613 m), weight 58.1 kg (128 lb), SpO2 97%.    Physical Exam:    Chest- CTA  Heart- RRR  Abdomen- NT/ND  Extremities- No edema    ASSESSMENT:     Change in bowel habits    PLAN:    Colonoscopy              "

## 2024-05-06 NOTE — ANESTHESIA PREPROCEDURE EVALUATION
clonazePAM (KlonoPIN) 1 MG disintegrating tablet   --  24  --  Historical Provider, MD    divalproex sodium (DEPAKOTE ER) 500 mg 24 hr tablet   --  24  --  Historical Provider, MD    fluvoxaMINE (LUVOX) 100 mg tablet   --  24  --  Historical Provider, MD    polyethylene glycol-electrolytes (TriLyte) 4000 mL solution ()   --  24  NATALIA Holguin    Take 4,000 mL by mouth once for 1 dose Take 4000 mL by mouth once for 1 dose. Use as directed    propranolol (INDERAL LA) 60 mg 24 hr capsule   --  24  --  Historical Provider, MD    traZODone (DESYREL) 100 mg tablet   --  24  --  Historical Provider, MD    valACYclovir (VALTREX) 500 mg tablet        Procedure:  COLONOSCOPY    Relevant Problems   ANESTHESIA (within normal limits)        Physical Exam    Airway    Mallampati score: II  TM Distance: >3 FB  Neck ROM: full     Dental       Cardiovascular  Rate: normal    Pulmonary  Pulmonary exam normal     Other Findings  Per pt denies anything remaining that is loose or removeablepost-pubertal.      Anesthesia Plan  ASA Score- 2     Anesthesia Type- IV sedation with anesthesia with ASA Monitors.         Additional Monitors:     Airway Plan:     Comment: Per patient, appropriately NPO, denies active CP/SOB/wheezing/symptoms related to heartburn/nausea/vomiting  .       Plan Factors-Exercise tolerance (METS): >4 METS.    Chart reviewed.    Patient summary reviewed.    Patient is not a current smoker.              Induction- intravenous.    Postoperative Plan-     Informed Consent- Anesthetic plan and risks discussed with patient.  I personally reviewed this patient with the CRNA. Discussed and agreed on the Anesthesia Plan with the CRNA..

## 2024-05-06 NOTE — ANESTHESIA POSTPROCEDURE EVALUATION
Post-Op Assessment Note    CV Status:  Stable  Pain Score: 0    Pain management: adequate       Mental Status:  Alert and awake   Hydration Status:  Stable   PONV Controlled:  None   Airway Patency:  Patent     Post Op Vitals Reviewed: Yes    No anethesia notable event occurred.    Staff: CRNA               BP   90/47   Temp   97.2   Pulse  82   Resp   12   SpO2   96

## 2024-06-24 ENCOUNTER — OFFICE VISIT (OUTPATIENT)
Dept: OBGYN CLINIC | Facility: CLINIC | Age: 59
End: 2024-06-24
Payer: COMMERCIAL

## 2024-06-24 VITALS
SYSTOLIC BLOOD PRESSURE: 100 MMHG | HEIGHT: 64 IN | BODY MASS INDEX: 21.85 KG/M2 | WEIGHT: 128 LBS | DIASTOLIC BLOOD PRESSURE: 72 MMHG

## 2024-06-24 DIAGNOSIS — Z01.419 WOMEN'S ANNUAL ROUTINE GYNECOLOGICAL EXAMINATION: Primary | ICD-10-CM

## 2024-06-24 DIAGNOSIS — N95.2 VAGINAL ATROPHY: ICD-10-CM

## 2024-06-24 DIAGNOSIS — Z12.31 SCREENING MAMMOGRAM, ENCOUNTER FOR: ICD-10-CM

## 2024-06-24 PROCEDURE — 99396 PREV VISIT EST AGE 40-64: CPT | Performed by: NURSE PRACTITIONER

## 2024-06-24 PROCEDURE — G0476 HPV COMBO ASSAY CA SCREEN: HCPCS | Performed by: NURSE PRACTITIONER

## 2024-06-24 PROCEDURE — 99386 PREV VISIT NEW AGE 40-64: CPT | Performed by: NURSE PRACTITIONER

## 2024-06-24 PROCEDURE — G0145 SCR C/V CYTO,THINLAYER,RESCR: HCPCS | Performed by: NURSE PRACTITIONER

## 2024-06-24 RX ORDER — ESTRADIOL 0.1 MG/G
CREAM VAGINAL
Qty: 42.5 G | Refills: 0 | Status: SHIPPED | OUTPATIENT
Start: 2024-06-24

## 2024-06-24 NOTE — PROGRESS NOTES
"  Subjective    HPI:     Rosina Tracey is a 59 y.o. postpartum female. Happily  for 30 yrs. She is a  2 Para 2, with a  and a C/S. She has not been able to be intimate with her spouse for about 3 yrs due to pain. She is interested in localized HRT to help with her symptoms. She denies /GI and Gyn complaints. She feels safe at home. She denies depression/anxiety.  Medical, surgical and family history reviewed. Her dental care is up-to-date. She eats a healthy diet and exercises regularly. She is happy with her weight.     Visit Vitals  /72 (BP Location: Right arm, Patient Position: Sitting)   Ht 5' 3.5\" (1.613 m)   Wt 58.1 kg (128 lb)   BMI 22.32 kg/m²   OB Status Postmenopausal   Smoking Status Never   BSA 1.61 m²       Gynecologic History    No LMP recorded. Patient is postmenopausal.    Last Pap: 8/3/17. Results were: normal    Last mammogram done once many years ago. Hasn't gone due to having implants. I explained that mammograms can still be done w/implants.    Colonoscopy: 24 - 10 yrs      Obstetric and Medical History    OB History    Para Term  AB Living   4       4 2   SAB IAB Ectopic Multiple Live Births   2 2     2      # Outcome Date GA Lbr Galindo/2nd Weight Sex Type Anes PTL Lv   4 IAB            3 IAB            2 SAB            1 SAB               Obstetric Comments   1    1 C/S- FTP and tight nuchal cord       Past Medical History:   Diagnosis Date    Anxiety     Depression        Past Surgical History:   Procedure Laterality Date     SECTION      LAPAROSCOPIC ENDOMETRIOSIS FULGURATION         The following portions of the patient's history were reviewed and updated as appropriate: allergies, current medications, past family history, past medical history, past social history, past surgical history, and problem list.    Review of Systems    Pertinent items are noted in HPI.      Objective    Physical Exam  Constitutional:       Appearance: Normal " appearance. She is well-developed.   Genitourinary:      Vulva, bladder and urethral meatus normal.      No lesions in the vagina.      Right Labia: No rash, tenderness, lesions, skin changes or Bartholin's cyst.     Left Labia: No tenderness, lesions, skin changes, Bartholin's cyst or rash.     No labial fusion noted.      No inguinal adenopathy present in the right or left side.     No vaginal discharge, erythema, tenderness, bleeding or granulation tissue.      No vaginal prolapse present.     Moderate vaginal atrophy present.     Vaginal exam comments: The vagina is evident of estrogen deficiency.   .        Right Adnexa: not tender, not full and no mass present.     Left Adnexa: not tender, not full and no mass present.     Cervix is not parous.      No cervical motion tenderness, discharge, friability, lesion, polyp or nabothian cyst.      Cervical exam comments: Cervix is small and stenotic.      Uterus is not enlarged, tender, irregular or prolapsed.      No uterine mass detected.     Uterus is anteverted.      Pelvic exam was performed with patient in the lithotomy position.   Breasts:     Breasts are symmetrical.      Right: Breast implant present. No inverted nipple, mass, nipple discharge, skin change or tenderness.      Left: Breast implant present. No inverted nipple, mass, nipple discharge, skin change or tenderness.   HENT:      Head: Normocephalic and atraumatic.   Neck:      Thyroid: No thyromegaly.   Cardiovascular:      Rate and Rhythm: Normal rate and regular rhythm.      Heart sounds: Normal heart sounds, S1 normal and S2 normal.   Pulmonary:      Effort: Pulmonary effort is normal.      Breath sounds: Normal breath sounds.   Abdominal:      General: Bowel sounds are normal. There is no distension.      Palpations: Abdomen is soft. There is no mass.      Tenderness: There is no abdominal tenderness. There is no guarding.      Hernia: There is no hernia in the left inguinal area or right  inguinal area.   Musculoskeletal:      Cervical back: Neck supple.   Lymphadenopathy:      Cervical: No cervical adenopathy.      Upper Body:      Right upper body: No supraclavicular or axillary adenopathy.      Left upper body: No supraclavicular or axillary adenopathy.      Lower Body: No right inguinal adenopathy. No left inguinal adenopathy.   Neurological:      Mental Status: She is alert.   Skin:     General: Skin is warm and dry.      Findings: No rash.   Psychiatric:         Attention and Perception: Attention and perception normal.         Mood and Affect: Mood and affect normal.         Speech: Speech normal.         Behavior: Behavior is cooperative.         Thought Content: Thought content normal.         Cognition and Memory: Cognition and memory normal.         Judgment: Judgment normal.   Vitals and nursing note reviewed.          Assessment and Plan    Rosina was seen today for gynecologic exam.    Diagnoses and all orders for this visit:    Women's annual routine gynecological examination    Screening mammogram, encounter for  -     Mammo screening bilateral w 3d & cad; Future    Vaginal atrophy  -     estradiol (ESTRACE VAGINAL) 0.1 mg/g vaginal cream; Insert 0.5g daily for 14 days, followed twice weekly (tues/thurs)      Patient informed of a Stable GYN exam. A pap smear was performed.     I have discussed the importance of exercise and healthy diet as well as adequate intake of calcium and vitamin D. The current ASCCP guidelines were reviewed. The low risk patient will receive pap smear screening every 3 years until the age of 29 and then every 3 to 5 years with HPV co-testing from the ages of 30-65. I emphasized the importance of an annual pelvic and breast exam. A yearly mammogram is recommended for breast cancer screening starting at age 40. Highly encouraged her to schedule her screening.      Results will be released to [facility], if abnormal will call to review and discuss treatment plan.      All questions have been answered to her satisfaction.       Hormone replacement therapy: hormone replacement therapy: estrace cream, risks and benefits reviewed, prescription for 3 months, and follow up appointment recommended.  Mammogram ordered.  Follow up in: 3 months for HRT follow-up or sooner if needed.

## 2024-06-25 LAB
HPV HR 12 DNA CVX QL NAA+PROBE: NEGATIVE
HPV16 DNA CVX QL NAA+PROBE: NEGATIVE
HPV18 DNA CVX QL NAA+PROBE: NEGATIVE

## 2024-06-28 LAB
LAB AP GYN PRIMARY INTERPRETATION: NORMAL
Lab: NORMAL

## 2024-07-01 ENCOUNTER — TELEPHONE (OUTPATIENT)
Age: 59
End: 2024-07-01

## 2024-07-01 NOTE — TELEPHONE ENCOUNTER
Pt called in stating that she used the estradiol cream and patient is now stating that she's having a burning sensation within the vagina and the patient stated she can't take the medication for the full 14 days. Patient wanted to know if there was anything else she could take in place of the estradiol cream as she's unable to use this medication .

## 2024-07-15 ENCOUNTER — PATIENT MESSAGE (OUTPATIENT)
Dept: OBGYN CLINIC | Facility: CLINIC | Age: 59
End: 2024-07-15

## 2024-07-15 DIAGNOSIS — N95.2 VAGINAL ATROPHY: ICD-10-CM

## 2024-07-15 RX ORDER — ESTRADIOL 0.1 MG/G
CREAM VAGINAL
Qty: 42.5 G | Refills: 0 | Status: SHIPPED | OUTPATIENT
Start: 2024-07-15 | End: 2024-07-16

## 2024-07-16 ENCOUNTER — PATIENT MESSAGE (OUTPATIENT)
Dept: OBGYN CLINIC | Facility: CLINIC | Age: 59
End: 2024-07-16

## 2024-07-16 DIAGNOSIS — N95.2 VAGINAL ATROPHY: Primary | ICD-10-CM

## 2024-07-16 RX ORDER — ESTRADIOL 10 UG/1
INSERT VAGINAL
Qty: 30 TABLET | Refills: 2 | Status: SHIPPED | OUTPATIENT
Start: 2024-07-16

## 2025-03-12 ENCOUNTER — HOSPITAL ENCOUNTER (EMERGENCY)
Facility: HOSPITAL | Age: 60
Discharge: HOME | End: 2025-03-12
Payer: COMMERCIAL

## 2025-03-12 ENCOUNTER — APPOINTMENT (EMERGENCY)
Dept: RADIOLOGY | Facility: HOSPITAL | Age: 60
End: 2025-03-12
Payer: COMMERCIAL

## 2025-03-12 VITALS
OXYGEN SATURATION: 97 % | DIASTOLIC BLOOD PRESSURE: 64 MMHG | HEART RATE: 70 BPM | SYSTOLIC BLOOD PRESSURE: 106 MMHG | TEMPERATURE: 98.1 F | RESPIRATION RATE: 18 BRPM

## 2025-03-12 DIAGNOSIS — R51.9 HEADACHE: ICD-10-CM

## 2025-03-12 DIAGNOSIS — R42 DIZZINESS: Primary | ICD-10-CM

## 2025-03-12 LAB
ANION GAP SERPL CALCULATED.3IONS-SCNC: 10 MMOL/L (ref 4–13)
BASOPHILS # BLD AUTO: 0.03 THOUSANDS/ÂΜL (ref 0–0.1)
BASOPHILS NFR BLD AUTO: 1 % (ref 0–1)
BUN SERPL-MCNC: 16 MG/DL (ref 5–25)
CALCIUM SERPL-MCNC: 9.7 MG/DL (ref 8.4–10.2)
CHLORIDE SERPL-SCNC: 103 MMOL/L (ref 96–108)
CO2 SERPL-SCNC: 25 MMOL/L (ref 21–32)
CREAT SERPL-MCNC: 0.73 MG/DL (ref 0.6–1.3)
EOSINOPHIL # BLD AUTO: 0.41 THOUSAND/ÂΜL (ref 0–0.61)
EOSINOPHIL NFR BLD AUTO: 8 % (ref 0–6)
ERYTHROCYTE [DISTWIDTH] IN BLOOD BY AUTOMATED COUNT: 12.1 % (ref 11.6–15.1)
GFR SERPL CREATININE-BSD FRML MDRD: 90 ML/MIN/1.73SQ M
GLUCOSE SERPL-MCNC: 84 MG/DL (ref 65–140)
HCT VFR BLD AUTO: 42 % (ref 34.8–46.1)
HGB BLD-MCNC: 13.4 G/DL (ref 11.5–15.4)
IMM GRANULOCYTES # BLD AUTO: 0.01 THOUSAND/UL (ref 0–0.2)
IMM GRANULOCYTES NFR BLD AUTO: 0 % (ref 0–2)
LYMPHOCYTES # BLD AUTO: 1.72 THOUSANDS/ÂΜL (ref 0.6–4.47)
LYMPHOCYTES NFR BLD AUTO: 33 % (ref 14–44)
MCH RBC QN AUTO: 29.4 PG (ref 26.8–34.3)
MCHC RBC AUTO-ENTMCNC: 31.9 G/DL (ref 31.4–37.4)
MCV RBC AUTO: 92 FL (ref 82–98)
MONOCYTES # BLD AUTO: 0.53 THOUSAND/ÂΜL (ref 0.17–1.22)
MONOCYTES NFR BLD AUTO: 10 % (ref 4–12)
NEUTROPHILS # BLD AUTO: 2.57 THOUSANDS/ÂΜL (ref 1.85–7.62)
NEUTS SEG NFR BLD AUTO: 48 % (ref 43–75)
NRBC BLD AUTO-RTO: 0 /100 WBCS
PLATELET # BLD AUTO: 181 THOUSANDS/UL (ref 149–390)
PMV BLD AUTO: 10.6 FL (ref 8.9–12.7)
POTASSIUM SERPL-SCNC: 4.1 MMOL/L (ref 3.5–5.3)
RBC # BLD AUTO: 4.56 MILLION/UL (ref 3.81–5.12)
SODIUM SERPL-SCNC: 138 MMOL/L (ref 135–147)
WBC # BLD AUTO: 5.27 THOUSAND/UL (ref 4.31–10.16)

## 2025-03-12 PROCEDURE — 96365 THER/PROPH/DIAG IV INF INIT: CPT

## 2025-03-12 PROCEDURE — 36415 COLL VENOUS BLD VENIPUNCTURE: CPT

## 2025-03-12 PROCEDURE — 80048 BASIC METABOLIC PNL TOTAL CA: CPT

## 2025-03-12 PROCEDURE — 96375 TX/PRO/DX INJ NEW DRUG ADDON: CPT

## 2025-03-12 PROCEDURE — 96361 HYDRATE IV INFUSION ADD-ON: CPT

## 2025-03-12 PROCEDURE — 99285 EMERGENCY DEPT VISIT HI MDM: CPT

## 2025-03-12 PROCEDURE — 85025 COMPLETE CBC W/AUTO DIFF WBC: CPT

## 2025-03-12 PROCEDURE — 70450 CT HEAD/BRAIN W/O DYE: CPT

## 2025-03-12 PROCEDURE — 99283 EMERGENCY DEPT VISIT LOW MDM: CPT

## 2025-03-12 RX ORDER — METOCLOPRAMIDE HYDROCHLORIDE 5 MG/ML
10 INJECTION INTRAMUSCULAR; INTRAVENOUS ONCE
Status: COMPLETED | OUTPATIENT
Start: 2025-03-12 | End: 2025-03-12

## 2025-03-12 RX ORDER — MAGNESIUM SULFATE HEPTAHYDRATE 40 MG/ML
2 INJECTION, SOLUTION INTRAVENOUS ONCE
Status: COMPLETED | OUTPATIENT
Start: 2025-03-12 | End: 2025-03-12

## 2025-03-12 RX ORDER — DEXAMETHASONE SODIUM PHOSPHATE 10 MG/ML
10 INJECTION, SOLUTION INTRAMUSCULAR; INTRAVENOUS ONCE
Status: COMPLETED | OUTPATIENT
Start: 2025-03-12 | End: 2025-03-12

## 2025-03-12 RX ORDER — MAGNESIUM OXIDE 400 MG/1
400 TABLET ORAL DAILY
Qty: 30 TABLET | Refills: 0 | Status: SHIPPED | OUTPATIENT
Start: 2025-03-12

## 2025-03-12 RX ADMIN — DEXAMETHASONE SODIUM PHOSPHATE 10 MG: 10 INJECTION, SOLUTION INTRAMUSCULAR; INTRAVENOUS at 13:56

## 2025-03-12 RX ADMIN — MAGNESIUM SULFATE HEPTAHYDRATE 2 G: 40 INJECTION, SOLUTION INTRAVENOUS at 14:33

## 2025-03-12 RX ADMIN — SODIUM CHLORIDE 1000 ML: 0.9 INJECTION, SOLUTION INTRAVENOUS at 13:54

## 2025-03-12 RX ADMIN — METOCLOPRAMIDE HYDROCHLORIDE 10 MG: 5 INJECTION INTRAMUSCULAR; INTRAVENOUS at 13:58

## 2025-03-12 NOTE — ED PROVIDER NOTES
"Time reflects when diagnosis was documented in both MDM as applicable and the Disposition within this note       Time User Action Codes Description Comment    3/12/2025  2:47 PM Kiran Villareal [R42] Dizziness     3/12/2025  2:47 PM Kiran Villareal [R51.9] Headache           ED Disposition       ED Disposition   Discharge    Condition   Stable    Date/Time   Wed Mar 12, 2025  2:47 PM    Comment   Rosina Tracey discharge to home/self care.                   Assessment & Plan       Medical Decision Making  Amount and/or Complexity of Data Reviewed  Labs: ordered.  Radiology: ordered.    Risk  OTC drugs.  Prescription drug management.      61 y/o F presents with dizzy/shaking spell. She reports feeling in usual state of health but when she got into her car she \"didn't feel right.\" Felt like her limbs were moving separately from any control she typically would have. Felt dizzy and off. Developed sudden onset frontal and posterior HA after. She was conscious and alert through the entire event. No prior hx of seizure d/o.  VSS  No focal neuro deficits on arrival  Ddx includes atypical migraine, SAH, partial seizure   Labs WNL  CT imaging without acute findings.  Pt feels better after migraine cocktail. D/w pt possible atypical migraine, no acute findings today, stable for discharge. Pt understands and agreeable with plan.          Medications   sodium chloride 0.9 % bolus 1,000 mL (0 mL Intravenous Stopped 3/12/25 1515)   metoclopramide (REGLAN) injection 10 mg (10 mg Intravenous Given 3/12/25 1358)   dexamethasone (PF) (DECADRON) injection 10 mg (10 mg Intravenous Given 3/12/25 1356)   magnesium sulfate 2 g/50 mL IVPB (premix) 2 g (0 g Intravenous Stopped 3/12/25 1517)       ED Risk Strat Scores                                                History of Present Illness       Chief Complaint   Patient presents with    Migraine     Pt. Was about to leave house when she felt shaky in car. Pt. States \"something just " "doesn't feel right\"  and now has a migraine       Past Medical History:   Diagnosis Date    Anxiety     Depression       Past Surgical History:   Procedure Laterality Date     SECTION      LAPAROSCOPIC ENDOMETRIOSIS FULGURATION        Family History   Problem Relation Age of Onset    Diabetes Mother     Heart disease Mother     No Known Problems Father     Anxiety disorder Paternal Grandmother     Depression Paternal Grandmother       Social History     Tobacco Use    Smoking status: Never    Smokeless tobacco: Never   Vaping Use    Vaping status: Never Used   Substance Use Topics    Alcohol use: No    Drug use: No      E-Cigarette/Vaping    E-Cigarette Use Never User       E-Cigarette/Vaping Substances    Nicotine No     THC No     CBD No     Flavoring No     Other No     Unknown No       I have reviewed and agree with the history as documented.     HPI  See mdm  Review of Systems   Constitutional:  Negative for chills and fever.   HENT:  Negative for ear pain and sore throat.    Eyes:  Negative for pain and visual disturbance.   Respiratory:  Negative for cough and shortness of breath.    Cardiovascular:  Negative for chest pain and palpitations.   Gastrointestinal:  Negative for abdominal pain and vomiting.   Genitourinary:  Negative for dysuria and hematuria.   Musculoskeletal:  Negative for arthralgias and back pain.   Skin:  Negative for color change and rash.   Neurological:  Negative for seizures and syncope.   All other systems reviewed and are negative.          Objective       ED Triage Vitals   Temperature Pulse Blood Pressure Respirations SpO2 Patient Position - Orthostatic VS   25 1319 25 1314 25 1314 25 1314 25 1314 25 1314   98.1 °F (36.7 °C) 65 142/68 18 99 % Lying      Temp Source Heart Rate Source BP Location FiO2 (%) Pain Score    25 1319 25 1314 25 1314 -- 25 1314    Oral Monitor Right arm  8      Vitals      Date and Time Temp " Pulse SpO2 Resp BP Pain Score FACES Pain Rating User   03/12/25 1515 -- 70 97 % 18 106/64 -- -- CS   03/12/25 1335 -- -- -- -- -- 8 -- CS   03/12/25 1319 98.1 °F (36.7 °C) -- -- -- -- -- -- CS   03/12/25 1314 -- 65 99 % 18 142/68 8 -- CS            Physical Exam  Vitals and nursing note reviewed.   Constitutional:       General: She is not in acute distress.  HENT:      Head: Normocephalic and atraumatic.      Right Ear: External ear normal.      Left Ear: External ear normal.      Nose: Nose normal.      Mouth/Throat:      Pharynx: Oropharynx is clear.   Eyes:      Extraocular Movements: Extraocular movements intact.      Pupils: Pupils are equal, round, and reactive to light.   Cardiovascular:      Rate and Rhythm: Normal rate and regular rhythm.      Pulses: Normal pulses.      Heart sounds: Normal heart sounds. No murmur heard.     No friction rub. No gallop.   Pulmonary:      Effort: Pulmonary effort is normal. No respiratory distress.      Breath sounds: Normal breath sounds. No wheezing, rhonchi or rales.   Abdominal:      General: Abdomen is flat. There is no distension.      Palpations: Abdomen is soft.      Tenderness: There is no abdominal tenderness. There is no guarding or rebound.   Musculoskeletal:         General: No deformity. Normal range of motion.      Cervical back: Normal range of motion.      Right lower leg: No edema.      Left lower leg: No edema.   Skin:     General: Skin is warm and dry.      Capillary Refill: Capillary refill takes less than 2 seconds.      Findings: No rash.   Neurological:      General: No focal deficit present.      Mental Status: She is alert and oriented to person, place, and time.      Cranial Nerves: No cranial nerve deficit.      Motor: No weakness.      Gait: Gait normal.   Psychiatric:         Mood and Affect: Mood normal.         Results Reviewed       Procedure Component Value Units Date/Time    Basic metabolic panel [[id number]] Collected: 03/12/25 1354    Lab  Status: Final result Specimen: Blood from Arm, Right Updated: 03/12/25 1427     Sodium 138 mmol/L      Potassium 4.1 mmol/L      Chloride 103 mmol/L      CO2 25 mmol/L      ANION GAP 10 mmol/L      BUN 16 mg/dL      Creatinine 0.73 mg/dL      Glucose 84 mg/dL      Calcium 9.7 mg/dL      eGFR 90 ml/min/1.73sq m     Narrative:      National Kidney Disease Foundation guidelines for Chronic Kidney Disease (CKD):     Stage 1 with normal or high GFR (GFR > 90 mL/min/1.73 square meters)    Stage 2 Mild CKD (GFR = 60-89 mL/min/1.73 square meters)    Stage 3A Moderate CKD (GFR = 45-59 mL/min/1.73 square meters)    Stage 3B Moderate CKD (GFR = 30-44 mL/min/1.73 square meters)    Stage 4 Severe CKD (GFR = 15-29 mL/min/1.73 square meters)    Stage 5 End Stage CKD (GFR <15 mL/min/1.73 square meters)  Note: GFR calculation is accurate only with a steady state creatinine    CBC and differential [[id number]]  (Abnormal) Collected: 03/12/25 1355    Lab Status: Final result Specimen: Blood from Arm, Right Updated: 03/12/25 1409     WBC 5.27 Thousand/uL      RBC 4.56 Million/uL      Hemoglobin 13.4 g/dL      Hematocrit 42.0 %      MCV 92 fL      MCH 29.4 pg      MCHC 31.9 g/dL      RDW 12.1 %      MPV 10.6 fL      Platelets 181 Thousands/uL      nRBC 0 /100 WBCs      Segmented % 48 %      Immature Grans % 0 %      Lymphocytes % 33 %      Monocytes % 10 %      Eosinophils Relative 8 %      Basophils Relative 1 %      Absolute Neutrophils 2.57 Thousands/µL      Absolute Immature Grans 0.01 Thousand/uL      Absolute Lymphocytes 1.72 Thousands/µL      Absolute Monocytes 0.53 Thousand/µL      Eosinophils Absolute 0.41 Thousand/µL      Basophils Absolute 0.03 Thousands/µL             CT head without contrast   Final Interpretation by Yolette Mahajan MD (03/12 8851)   No acute intracranial hemorrhage seen   No mass effect or midline shift seen                     Workstation performed: [id number]             Procedures    ED Medication and  Procedure Management   Prior to Admission Medications   Prescriptions Last Dose Informant Patient Reported? Taking?   clonazePAM (KlonoPIN) 1 MG disintegrating tablet  Self Yes No   Sig: PLACE 1 TABLET ON THE TONUE AND ALLOW TO DISSOLVE THEN SWALLOW THREE TIMES A DAY   divalproex sodium (DEPAKOTE ER) 500 mg 24 hr tablet  Self Yes No   Sig: Take 1,000 mg by mouth daily at bedtime   estradiol (VAGIFEM, YUVAFEM) 10 MCG TABS vaginal tablet   No No   Sig: Insert tablet daily for 14 days, followed by twice weekly (Tues/Thurs)   fluvoxaMINE (LUVOX) 100 mg tablet  Self Yes No   Sig: Take 150 mg by mouth daily   propranolol (INDERAL LA) 60 mg 24 hr capsule  Self Yes No   Sig: Take 60 mg by mouth daily at bedtime   traZODone (DESYREL) 100 mg tablet  Self Yes No   Sig: Take 100 mg by mouth daily at bedtime as needed   valACYclovir (VALTREX) 500 mg tablet  Self Yes No   Sig: Take by mouth      Facility-Administered Medications: None     Discharge Medication List as of 3/12/2025  2:48 PM        START taking these medications    Details   magnesium oxide (MAG-OX) 400 mg tablet Take 1 tablet (400 mg total) by mouth daily, Starting Wed 3/12/2025, Normal           CONTINUE these medications which have NOT CHANGED    Details   clonazePAM (KlonoPIN) 1 MG disintegrating tablet PLACE 1 TABLET ON THE TONUE AND ALLOW TO DISSOLVE THEN SWALLOW THREE TIMES A DAY, Historical Med      divalproex sodium (DEPAKOTE ER) 500 mg 24 hr tablet Take 1,000 mg by mouth daily at bedtime, Starting Sat 3/16/2024, Historical Med      estradiol (VAGIFEM, YUVAFEM) 10 MCG TABS vaginal tablet Insert tablet daily for 14 days, followed by twice weekly (Tues/Thurs), Normal      fluvoxaMINE (LUVOX) 100 mg tablet Take 150 mg by mouth daily, Starting Sun 3/17/2024, Historical Med      propranolol (INDERAL LA) 60 mg 24 hr capsule Take 60 mg by mouth daily at bedtime, Starting Mon 3/4/2024, Historical Med      traZODone (DESYREL) 100 mg tablet Take 100 mg by mouth daily  at bedtime as needed, Starting Sun 3/17/2024, Historical Med      valACYclovir (VALTREX) 500 mg tablet Take by mouth, Starting Thu 8/3/2017, Historical Med           No discharge procedures on file.  ED SEPSIS DOCUMENTATION   Time reflects when diagnosis was documented in both MDM as applicable and the Disposition within this note       Time User Action Codes Description Comment    3/12/2025  2:47 PM Kiran Villareal [R42] Dizziness     3/12/2025  2:47 PM Kiran Villareal [R51.9] Headache                  Kiran Villareal MD  03/13/25 1140